# Patient Record
Sex: FEMALE | URBAN - METROPOLITAN AREA
[De-identification: names, ages, dates, MRNs, and addresses within clinical notes are randomized per-mention and may not be internally consistent; named-entity substitution may affect disease eponyms.]

---

## 2020-07-15 ENCOUNTER — APPOINTMENT (OUTPATIENT)
Age: 40
Setting detail: DERMATOLOGY
End: 2020-08-15

## 2020-07-15 VITALS — TEMPERATURE: 99.8 F

## 2020-07-15 DIAGNOSIS — L98.8 OTHER SPECIFIED DISORDERS OF THE SKIN AND SUBCUTANEOUS TISSUE: ICD-10-CM

## 2020-07-15 PROCEDURE — OTHER DYSPORT: OTHER

## 2020-08-06 ENCOUNTER — APPOINTMENT (OUTPATIENT)
Age: 40
Setting detail: DERMATOLOGY
End: 2020-08-15

## 2020-08-06 VITALS — TEMPERATURE: 98.6 F

## 2020-08-06 DIAGNOSIS — L98.8 OTHER SPECIFIED DISORDERS OF THE SKIN AND SUBCUTANEOUS TISSUE: ICD-10-CM

## 2020-08-06 DIAGNOSIS — Z41.9 ENCOUNTER FOR PROCEDURE FOR PURPOSES OTHER THAN REMEDYING HEALTH STATE, UNSPECIFIED: ICD-10-CM

## 2020-08-06 PROCEDURE — OTHER DYSPORT: OTHER

## 2020-08-06 PROCEDURE — OTHER FILLERS: OTHER

## 2020-08-17 ENCOUNTER — APPOINTMENT (OUTPATIENT)
Age: 40
Setting detail: DERMATOLOGY
End: 2020-08-24

## 2020-08-17 DIAGNOSIS — L98.8 OTHER SPECIFIED DISORDERS OF THE SKIN AND SUBCUTANEOUS TISSUE: ICD-10-CM

## 2020-08-17 PROCEDURE — OTHER DYSPORT: OTHER

## 2020-08-20 ENCOUNTER — APPOINTMENT (OUTPATIENT)
Age: 40
Setting detail: DERMATOLOGY
End: 2020-08-24

## 2020-08-20 VITALS — TEMPERATURE: 98.5 F

## 2020-08-20 DIAGNOSIS — Z41.9 ENCOUNTER FOR PROCEDURE FOR PURPOSES OTHER THAN REMEDYING HEALTH STATE, UNSPECIFIED: ICD-10-CM

## 2020-08-20 PROCEDURE — OTHER FILLERS: OTHER

## 2020-10-12 ENCOUNTER — APPOINTMENT (OUTPATIENT)
Age: 40
Setting detail: DERMATOLOGY
End: 2020-10-21

## 2020-10-12 VITALS — TEMPERATURE: 98.4 F

## 2020-10-12 DIAGNOSIS — Z41.9 ENCOUNTER FOR PROCEDURE FOR PURPOSES OTHER THAN REMEDYING HEALTH STATE, UNSPECIFIED: ICD-10-CM

## 2020-10-12 DIAGNOSIS — L98.8 OTHER SPECIFIED DISORDERS OF THE SKIN AND SUBCUTANEOUS TISSUE: ICD-10-CM

## 2020-10-12 PROCEDURE — OTHER FILLERS: OTHER

## 2020-10-12 PROCEDURE — OTHER ADDITIONAL NOTES: OTHER

## 2020-10-12 NOTE — PROCEDURE: ADDITIONAL NOTES
Additional Notes: [employer] 30 units. Lot number [id number]. Exp 05/2022 Additional Notes: [employer] 30 units. Lot number [id number]. Exp 05/2022

## 2021-06-05 NOTE — PROCEDURE: DYSPORT
Notified Dr Sherice Del Rosario of pt's heart rate of [place]  06/04/21 [id number] Inferior Lateral Orbicularis Oculi Units: 50
